# Patient Record
Sex: FEMALE | Race: WHITE
[De-identification: names, ages, dates, MRNs, and addresses within clinical notes are randomized per-mention and may not be internally consistent; named-entity substitution may affect disease eponyms.]

---

## 2019-12-25 ENCOUNTER — HOSPITAL ENCOUNTER (EMERGENCY)
Dept: HOSPITAL 97 - ER | Age: 39
Discharge: HOME | End: 2019-12-25
Payer: SELF-PAY

## 2019-12-25 VITALS — DIASTOLIC BLOOD PRESSURE: 77 MMHG | OXYGEN SATURATION: 97 % | SYSTOLIC BLOOD PRESSURE: 100 MMHG

## 2019-12-25 VITALS — TEMPERATURE: 97.9 F

## 2019-12-25 DIAGNOSIS — N39.0: ICD-10-CM

## 2019-12-25 DIAGNOSIS — Z72.0: ICD-10-CM

## 2019-12-25 DIAGNOSIS — R07.81: Primary | ICD-10-CM

## 2019-12-25 LAB
BUN BLD-MCNC: 6 MG/DL (ref 7–18)
GLUCOSE SERPLBLD-MCNC: 104 MG/DL (ref 74–106)
HCT VFR BLD CALC: 42 % (ref 36–45)
LYMPHOCYTES # SPEC AUTO: 4.4 K/UL (ref 0.7–4.9)
PMV BLD: 8.2 FL (ref 7.6–11.3)
POTASSIUM SERPL-SCNC: 3.6 MMOL/L (ref 3.5–5.1)
RBC # BLD: 4.64 M/UL (ref 3.86–4.86)
UA COMPLETE W REFLEX CULTURE PNL UR: (no result)

## 2019-12-25 PROCEDURE — 87186 SC STD MICRODIL/AGAR DIL: CPT

## 2019-12-25 PROCEDURE — 85379 FIBRIN DEGRADATION QUANT: CPT

## 2019-12-25 PROCEDURE — 36415 COLL VENOUS BLD VENIPUNCTURE: CPT

## 2019-12-25 PROCEDURE — 87088 URINE BACTERIA CULTURE: CPT

## 2019-12-25 PROCEDURE — 96374 THER/PROPH/DIAG INJ IV PUSH: CPT

## 2019-12-25 PROCEDURE — 99284 EMERGENCY DEPT VISIT MOD MDM: CPT

## 2019-12-25 PROCEDURE — 81003 URINALYSIS AUTO W/O SCOPE: CPT

## 2019-12-25 PROCEDURE — 85025 COMPLETE CBC W/AUTO DIFF WBC: CPT

## 2019-12-25 PROCEDURE — 81025 URINE PREGNANCY TEST: CPT

## 2019-12-25 PROCEDURE — 87077 CULTURE AEROBIC IDENTIFY: CPT

## 2019-12-25 PROCEDURE — 87086 URINE CULTURE/COLONY COUNT: CPT

## 2019-12-25 PROCEDURE — 81015 MICROSCOPIC EXAM OF URINE: CPT

## 2019-12-25 PROCEDURE — 80048 BASIC METABOLIC PNL TOTAL CA: CPT

## 2019-12-25 PROCEDURE — 71275 CT ANGIOGRAPHY CHEST: CPT

## 2019-12-25 NOTE — EDPHYS
Physician Documentation                                                                           

 [facility]                                                                 

Name: Phoenix Grady                                                                               

Age: 39 yrs                                                                                       

Sex: Female                                                                                       

: 1980                                                                                   

MRN: [id number]                                                                                   

Arrival Date: 2019                                                                          

Time: 04:30                                                                                       

Account#: [id number]                                                                            

Bed 6                                                                                             

Private MD:                                                                                       

ED Physician Jarrett Pierre                                                                             

HPI:                                                                                              

                                                                                             

04:51 This 39 yrs old  Female presents to ER via Ambulatory with complaints of Rib   pkl 

      Pain.                                                                                       

04:51 The patient or guardian reports chest pain that is located primarily in the Right       pkl 

      lateral chest pain. Onset: just prior to arrival, 2 hour(s) ago. The pain does not          

      radiate. Associated signs and symptoms: The patient has no apparent associated signs or     

      symptoms. The chest pain is described as sharp. Pain sudden onset. Denies any injury or     

      cough.                                                                                      

                                                                                                  

OB/GYN:                                                                                           

04:30 LMP 2019                                                                          fc  

                                                                                                  

Historical:                                                                                       

- Allergies:                                                                                      

04:49 No Known Allergies;                                                                     fc  

- Home Meds:                                                                                      

04:49 None [Active];                                                                          fc  

- PMHx:                                                                                           

04:49 mennigitis;                                                                             fc  

- PSHx:                                                                                           

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  

                                                                                                  

- Immunization history:: Last tetanus immunization: unknown, Flu vaccine is not up to             

  date.                                                                                           

- Social history:: Smoking status: Patient uses tobacco products, denies chronic                  

  smoking, but will smoke occasionally, Patient uses alcohol, occasionally. street                

  drugs, marijuana.                                                                               

- Ebola Screening: : Patient negative for fever greater than or equal to 101.5 degrees            

  Fahrenheit, and additional compatible Ebola Virus Disease symptoms Patient denies               

  exposure to infectious person Patient denies travel to an Ebola-affected area in the            

  21 days before illness onset.                                                                   

                                                                                                  

                                                                                                  

ROS:                                                                                              

04:51 Eyes: Negative for injury, pain, redness, and discharge, ENT: Negative for injury,      pkl 

      pain, and discharge, Neck: Negative for injury, pain, and swelling.                         

04:51 Cardiovascular: Positive for chest pain, of the right  lateral  chest.                      

04:51 Respiratory: Negative for cough, shortness of breath.                                       

04:51 Abdomen/GI: Negative for abdominal pain, nausea, vomiting, and diarrhea.                    

04:51 Back: Negative for acute changes.                                                           

04:51 : Negative for urinary symptoms.                                                          

04:51 MS/extremity: Negative for acute changes.                                                   

04:51 Skin: Negative for rash.                                                                    

04:51 Neuro: Negative for altered mental status.                                                  

                                                                                                  

Exam:                                                                                             

04:51 Head/Face:  Normocephalic, atraumatic. Eyes:  Pupils equal round and reactive to light, pkl 

      extra-ocular motions intact.  Lids and lashes normal.  Conjunctiva and sclera are           

      non-icteric and not injected.  Cornea within normal limits.  Periorbital areas with no      

      swelling, redness, or edema. ENT:  Nares patent. No nasal discharge, no septal              

      abnormalities noted.  Tympanic membranes are normal and external auditory canals are        

      clear.  Oropharynx with no redness, swelling, or masses, exudates, or evidence of           

      obstruction, uvula midline.  Mucous membranes moist. Neck:  Trachea midline, no             

      thyromegaly or masses palpated, and no cervical lymphadenopathy.  Supple, full range of     

      motion without nuchal rigidity, or vertebral point tenderness.  No Meningismus.             

      Chest/axilla:  Normal chest wall appearance and motion.  Nontender with no deformity.       

      No lesions are appreciated. Cardiovascular:  Regular rate and rhythm with a normal S1       

      and S2.  No gallops, murmurs, or rubs.  Normal PMI, no JVD.  No pulse deficits.             

      Respiratory:  Lungs have equal breath sounds bilaterally, clear to auscultation and         

      percussion.  No rales, rhonchi or wheezes noted.  No increased work of breathing, no        

      retractions or nasal flaring. Abdomen/GI:  Soft, non-tender, with normal bowel sounds.      

      No distension or tympany.  No guarding or rebound.  No evidence of tenderness               

      throughout. Back:  No spinal tenderness.  No costovertebral tenderness.  Full range of      

      motion. Skin:  Warm, dry with normal turgor.  Normal color with no rashes, no lesions,      

      and no evidence of cellulitis. MS/ Extremity:  Pulses equal, no cyanosis.                   

      Neurovascular intact.  Full, normal range of motion. Neuro:  Awake and alert, GCS 15,       

      oriented to person, place, time, and situation.  Cranial nerves II-XII grossly intact.      

      Motor strength 5/5 in all extremities.  Sensory grossly intact.  Cerebellar exam            

      normal.  Normal gait.                                                                       

                                                                                                  

Vital Signs:                                                                                      

04:30 BP 97 / 86; Pulse 90; Resp 18; Temp 97.9(O); Pulse Ox 100% on R/A; Weight 54.43 kg (R); fc  

      Height 5 ft. 7 in. (170.18 cm) (R); Pain 10/10;                                             

06:03  / 77; Pulse 71; Resp 18; Pulse Ox 97% on R/A;                                    wh  

07:21  / 86; Pulse 72; Resp 16 S; Temp 98.0(TE); Pulse Ox 98% on R/A; Pain 5/10;        aa5 

04:30 Body Mass Index 18.79 (54.43 kg, 170.18 cm)                                             fc  

                                                                                                  

MDM:                                                                                              

04:44 Patient medically screened.                                                             pkl 

05:38 Data reviewed: vital signs, nurses notes, lab test result(s), EKG. ED course: D-Dimer ( pkl 

      1384 ) CT Chest PE Angio ordered.                                                           

                                                                                                  

                                                                                             

04:57 Order name: CBC with Diff; Complete Time: 05:20                                         pkl 

                                                                                             

04:57 Order name: Chem 7; Complete Time: 05:33                                                pkl 

                                                                                             

04:57 Order name: D-Dimer; Complete Time: 05:23                                               pkl 

                                                                                             

04:58 Order name: Urine Microscopic Only; Complete Time: 05:43                                ds4 

                                                                                             

04:58 Order name: Urine Culture                                                               ds4 

                                                                                             

04:58 Order name: Urine Pregnancy--Ancillary (enter results); Complete Time: 05:18            ds4 

                                                                                             

04:49 Order name: Saline Lock; Complete Time: 05:14                                           pkl 

                                                                                             

04:58 Order name: Urine Dipstick-Ancillary (obtain specimen); Complete Time: 04:58            ds4 

                                                                                             

05:00 Order name: Urine Dipstick--Ancillary (enter results); Complete Time: 05:18             ds4 

                                                                                             

05:33 Order name: CT Chest For PE Angio                                                       pkl 

                                                                                             

04:58 Order name: Urine Pregnancy Test (obtain specimen); Complete Time: 04:58                ds4 

                                                                                                  

Administered Medications:                                                                         

05:18 Drug: TORadol 30 mg Route: IVP; Site: right antecubital;                                wh  

06:35 Follow up: Response: No adverse reaction; Pain is decreased                               

05:33 Drug: NS 0.9% 500 ml Route: IV; Rate: bolus; Site: right antecubital;                   wh  

                                                                                                  

                                                                                                  

Disposition:                                                                                      

19 07:03 Discharged to Home. Impression: Right rib cage pain. Urinary tract infection.      

- Condition is Stable.                                                                            

                                                                                                  

- Prescriptions for Ultram 50 mg Oral Tablet - take 1 tablet by ORAL route every 8                

  hours As needed; 12 tablet. Cipro 500 mg Oral Tablet - take 1 tablet by ORAL route              

  every 12 hours for 7 days; 10 tablet.                                                           

- Medication Reconciliation Form, Thank You Letter, Antibiotic Education, Prescription            

  Opioid Use form.                                                                                

- Follow up: Private Physician; When: 2 - 3 days; Reason: Re-evaluation by your                   

  physician.                                                                                      

- Problem is new.                                                                                 

- Symptoms have improved.                                                                         

                                                                                                  

                                                                                                  

                                                                                                  

Signatures:                                                                                       

Dispatcher [employer]                           EDMS                                                 

Jarrett Pierre MD MD   pkl                                                  

Terri Perera RN                   RN                                                      

Dalila La RN                     RN   aa5                                                  

David Quinn                             4                                                  

Sharon San                                                                                   

                                                                                                  

Corrections: (The following items were deleted from the chart)                                    

05:38 04:51 Thorax Wo Con+CT.RAD.BRZ ordered. [facility]                                            EDMS

07:26 07:03 2019 07:03 Discharged to Home. Impression: Right rib cage pain. Urinary     aa5 

      tract infection. Condition is Stable. Forms are Medication Reconciliation Form, Thank       

      You Letter, Antibiotic Education, Prescription Opioid Use. Follow up: Private               

      Physician; When: 2 - 3 days; Reason: Re-evaluation by your physician. Problem is new.       

      Symptoms have improved. pkl                                                                 

                                                                                                  

**************************************************************************************************

## 2019-12-25 NOTE — ER
Nurse's Notes                                                                                     

 [facility]                                                                 

Name: Phoenix Grady                                                                               

Age: 39 yrs                                                                                       

Sex: Female                                                                                       

: 1980                                                                                   

MRN: [id number]                                                                                   

Arrival Date: 2019                                                                          

Time: 04:30                                                                                       

Account#: [id number]                                                                            

Bed 6                                                                                             

Private MD:                                                                                       

Diagnosis: Right rib cage pain. Urinary tract infection                                           

                                                                                                  

Presentation:                                                                                     

                                                                                             

04:30 Presenting complaint: Patient states: that all of a sudden 2 hrs ago she started to       

      have right sided rib pain. The pain is worse with deep breathing and moving. Transition     

      of care: patient was not received from another setting of care. Onset of symptoms was       

      2019 at 02:30. Risk Assessment: Do you want to hurt yourself or someone        

      else? Patient reports no desire to harm self or others. Initial Sepsis Screen: Does the     

      patient meet any 2 criteria? No. Patient's initial sepsis screen is negative. Does the      

      patient have a suspected source of infection? No. Patient's initial sepsis screen is        

      negative. Care prior to arrival: None.                                                      

04:30 Method Of Arrival: Ambulatory                                                             

04:30 Acuity: RAPHAEL 3                                                                           fc  

                                                                                                  

OB/GYN:                                                                                           

04:30 LMP 2019                                                                            

                                                                                                  

Historical:                                                                                       

- Allergies:                                                                                      

04:49 No Known Allergies;                                                                     fc  

- Home Meds:                                                                                      

04:49 None [Active];                                                                          fc  

- PMHx:                                                                                           

04:49 mennigitis;                                                                             fc  

- PSHx:                                                                                           

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  

                                                                                                  

- Immunization history:: Last tetanus immunization: unknown, Flu vaccine is not up to             

  date.                                                                                           

- Social history:: Smoking status: Patient uses tobacco products, denies chronic                  

  smoking, but will smoke occasionally, Patient uses alcohol, occasionally. street                

  drugs, marijuana.                                                                               

- Ebola Screening: : Patient negative for fever greater than or equal to 101.5 degrees            

  Fahrenheit, and additional compatible Ebola Virus Disease symptoms Patient denies               

  exposure to infectious person Patient denies travel to an Ebola-affected area in the            

  21 days before illness onset.                                                                   

                                                                                                  

                                                                                                  

Screenin:30 Abuse screen: Denies threats or abuse. Nutritional screening: No deficits noted.          

      Tuberculosis screening: No symptoms or risk factors identified. Fall Risk None              

      identified.                                                                                 

                                                                                                  

Assessment:                                                                                       

04:45 General: Appears in no apparent distress. Behavior is calm, cooperative, appropriate    wh  

      for age. Pain: Complains of pain in right lateral posterior chest and right lateral         

      anterior chest Pain radiates to back Pain currently is 8 out of 10 on a pain scale.         

      Pain began 4 hours ago. Neuro: Level of Consciousness is awake, alert, obeys commands,      

      Oriented to person, place, time, situation, Appropriate for age. Cardiovascular: Heart      

      tones S1 S2 Rhythm is regular. Respiratory: Airway is patent Respiratory effort is          

      even, unlabored, Respiratory pattern is regular, symmetrical, Breath sounds are clear       

      bilaterally. GI: Abdomen is flat, non-distended, Abd is soft and non tender X 4 quads.      

      : No signs and/or symptoms were reported regarding the genitourinary system. EENT: No     

      signs and/or symptoms were reported regarding the EENT system. Derm: Skin is intact, is     

      healthy with good turgor, Skin is pink, warm \T\ dry. normal. Musculoskeletal:              

      Circulation, motion, and sensation intact.                                                  

06:00 Reassessment: Patient appears in no apparent distress at this time. No changes from       

      previously documented assessment. Patient and/or family updated on plan of care and         

      expected duration. Pain level reassessed. Patient is alert, oriented x 3, equal             

      unlabored respirations, skin warm/dry/pink.                                                 

07:21 Reassessment: Patient is alert, oriented x 3, equal unlabored respirations, skin        aa5 

      warm/dry/pink.                                                                              

                                                                                                  

Vital Signs:                                                                                      

04:30 BP 97 / 86; Pulse 90; Resp 18; Temp 97.9(O); Pulse Ox 100% on R/A; Weight 54.43 kg (R); fc  

      Height 5 ft. 7 in. (170.18 cm) (R); Pain 10/10;                                             

06:03  / 77; Pulse 71; Resp 18; Pulse Ox 97% on R/A;                                    wh  

07:21  / 86; Pulse 72; Resp 16 S; Temp 98.0(TE); Pulse Ox 98% on R/A; Pain 5/10;        aa5 

04:30 Body Mass Index 18.79 (54.43 kg, 170.18 cm)                                               

                                                                                                  

ED Course:                                                                                        

04:30 Patient arrived in ED.                                                                  ds1 

04:30 Arm band placed on Patient placed in an exam room, on a stretcher.                        

04:30 Patient has correct armband on for positive identification. Placed in gown. Bed in low  fc  

      position. Call light in reach. Pulse ox on. NIBP on.                                        

04:30 No provider procedures requiring assistance completed.                                    

04:44 Jarrett Pierre MD is Attending Physician.                                                    pkl 

04:45 Inserted saline lock: 22 gauge in right antecubital area, using aseptic technique.        

      Blood collected.                                                                            

04:46 Triage completed.                                                                         

04:51 Sharon San is Primary Nurse.                                                           

06:09 CT Chest For PE Angio In Process Unspecified.                                           EDMS

07:21 IV discontinued, intact, bleeding controlled, No redness/swelling at site. Pressure     aa5 

      dressing applied.                                                                           

                                                                                                  

Administered Medications:                                                                         

05:18 Drug: TORadol 30 mg Route: IVP; Site: right antecubital;                                  

06:35 Follow up: Response: No adverse reaction; Pain is decreased                               

05:33 Drug: NS 0.9% 500 ml Route: IV; Rate: bolus; Site: right antecubital;                     

                                                                                                  

                                                                                                  

Outcome:                                                                                          

07:03 Discharge ordered by MD.                                                                pkl 

07:22 Discharged to home ambulatory, with family.                                             aa5 

07:22 Condition: stable                                                                           

07:22 Discharge instructions given to patient, Instructed on discharge instructions, follow       

      up and referral plans. medication usage, Demonstrated understanding of instructions,        

      follow-up care, medications, Prescriptions given X 2.                                       

07:26 Patient left the ED.                                                                    aa5 

                                                                                                  

Addendum:                                                                                         

2019                                                                                        

     07:17 Addendum: Culture Results: Positive urine culture. No further action required. Bacteria e
b

           sensitive to prescribed antibiotic.                                                    

                                                                                                  

Signatures:                                                                                       

Dispatcher [employer]                           EDMS                                                 

Jarrett Pierre MD MD pkl Chretien, Felicia, RN RN                                                      

Angelica Chavez                                ds1                                                  

Dalila La RN                     RN   aa5                                                  

Sharon San                                                                                   

Elza Sheikh                                                   

                                                                                                  

Corrections: (The following items were deleted from the chart)                                    

                                                                                             

07:27 07:22 Discharge instructions given to patient, Instructed on discharge instructions,    aa5 

      follow up and referral plans. medication usage, Demonstrated understanding of               

      instructions, follow-up care, medications, aa5                                              

                                                                                                  

************************************************************************************************** Rx approved

## 2019-12-25 NOTE — RAD REPORT
EXAM DESCRIPTION:   CT Angiography Chest With Intravenous Contrast

 

CLINICAL HISTORY:  The patient is 39 years old and is Female; chest   pain

 

TECHNIQUE:  Axial computed tomographic angiography images of the chest with intravenous contrast.   S
agittal and coronal reformatted images were created and reviewed.   This CT exam was performed using 
one or more of the following dose reduction techniques:   automated exposure control, adjustment of t
he mA and/or kV according to patient size, and/or use of iterative reconstruction technique.   MIP re
constructed images were created and reviewed.

 

COMPARISON:  No relevant prior studies available.

 

FINDINGS:  Pulmonary arteries:   Unremarkable.   No pulmonary embolism.

   Aorta:   No acute findings.   No thoracic aortic aneurysm.

   Lungs:   Unremarkable.   No mass.   No consolidation.

   Pleural space:   Unremarkable.   No significant effusion.   No pneumothorax.

   Heart:   Unremarkable.   No cardiomegaly.   No significant pericardial effusion.   No evidence of 
RV dysfunction.

   Bones/joints:   No acute fracture.   No dislocation.

   Soft tissues:   Unremarkable.

   Lymph nodes:   Unremarkable.   No enlarged lymph nodes.

 

IMPRESSION:  No pulmonary embolus.

 

Electronically signed by:   Chin Bautista MD   12/25/2019 6:55 AM CST Workstation: [id number]

 

 

Due to temporary technical issues with the PACS/Fluency reporting system, reports are being signed by
 the in house radiologist as a courtesy to ensure prompt reporting. The interpreting radiologist is f
ully responsible for the content of the report.